# Patient Record
Sex: FEMALE | Race: OTHER | URBAN - METROPOLITAN AREA
[De-identification: names, ages, dates, MRNs, and addresses within clinical notes are randomized per-mention and may not be internally consistent; named-entity substitution may affect disease eponyms.]

---

## 2018-02-20 ENCOUNTER — OFFICE VISIT (OUTPATIENT)
Dept: INTERNAL MEDICINE CLINIC | Facility: CLINIC | Age: 23
End: 2018-02-20

## 2018-02-20 VITALS
SYSTOLIC BLOOD PRESSURE: 113 MMHG | DIASTOLIC BLOOD PRESSURE: 73 MMHG | BODY MASS INDEX: 24.72 KG/M2 | HEART RATE: 69 BPM | TEMPERATURE: 99 F | WEIGHT: 150.19 LBS | HEIGHT: 65.5 IN

## 2018-02-20 DIAGNOSIS — Z76.89 ENCOUNTER TO ESTABLISH CARE: Primary | ICD-10-CM

## 2018-02-20 DIAGNOSIS — L70.0 ACNE VULGARIS: ICD-10-CM

## 2018-02-20 PROCEDURE — 99212 OFFICE O/P EST SF 10 MIN: CPT | Performed by: INTERNAL MEDICINE

## 2018-02-20 PROCEDURE — 99203 OFFICE O/P NEW LOW 30 MIN: CPT | Performed by: INTERNAL MEDICINE

## 2018-02-20 RX ORDER — DOXYCYCLINE HYCLATE 50 MG/1
50 CAPSULE ORAL 2 TIMES DAILY
Qty: 60 CAPSULE | Refills: 1 | Status: SHIPPED | OUTPATIENT
Start: 2018-02-20 | End: 2018-05-10

## 2018-02-20 RX ORDER — CLINDAMYCIN PHOSPHATE AND BENZOYL PEROXIDE 10; 50 MG/G; MG/G
GEL TOPICAL
Qty: 1 TUBE | Refills: 3 | Status: SHIPPED | OUTPATIENT
Start: 2018-02-20 | End: 2021-10-26 | Stop reason: ALTCHOICE

## 2018-02-20 NOTE — PROGRESS NOTES
HPI:    Patient ID: Tri Luciano is a 25year old female. HPI she came today to establish care with new physician. She states that for many years  she has acne and she used to take clindamycin and doxycycline before but she stopped taking.   Yari Neves bid Disp: 1 Tube Rfl: 3   Doxycycline Hyclate 50 MG Oral Cap Take 1 capsule (50 mg total) by mouth 2 (two) times daily.  Disp: 60 capsule Rfl: 1     Allergies:No Known Allergies    HISTORY:  Past Medical History:   Diagnosis Date   • Acne       History revi heard.  Pulmonary/Chest: Effort normal and breath sounds normal. No accessory muscle usage. No respiratory distress. She has no wheezes. She has no rales. She exhibits no tenderness. Abdominal: Soft.  Bowel sounds are normal. She exhibits no shifting dull

## 2018-02-21 ENCOUNTER — TELEPHONE (OUTPATIENT)
Dept: INTERNAL MEDICINE CLINIC | Facility: CLINIC | Age: 23
End: 2018-02-21

## 2018-02-21 NOTE — TELEPHONE ENCOUNTER
Patient called, the rx for Clindamycin is not covered under her insurance, can you please send a new rx to [facility]?

## 2018-02-22 RX ORDER — CLINDAMYCIN PHOSPHATE 10 MG/G
GEL TOPICAL
Qty: 60 G | Refills: 2 | Status: SHIPPED | OUTPATIENT
Start: 2018-02-22 | End: 2021-10-26 | Stop reason: ALTCHOICE

## 2018-02-22 NOTE — TELEPHONE ENCOUNTER
Can you check with her phrmacy what is covered as substitute , acne medication most of them not covered

## 2018-02-22 NOTE — TELEPHONE ENCOUNTER
Spoke with mother Lisbet Neves, informed that two separate gels were prescribed instead of the combination medication. Verbalized understanding.

## 2018-05-10 RX ORDER — DOXYCYCLINE HYCLATE 50 MG/1
50 CAPSULE ORAL 2 TIMES DAILY
Qty: 60 CAPSULE | Refills: 1 | Status: SHIPPED | OUTPATIENT
Start: 2018-05-10 | End: 2021-10-26 | Stop reason: ALTCHOICE

## 2018-06-26 ENCOUNTER — TELEPHONE (OUTPATIENT)
Dept: INTERNAL MEDICINE CLINIC | Facility: CLINIC | Age: 23
End: 2018-06-26

## 2018-06-28 NOTE — TELEPHONE ENCOUNTER
Patient notified of Referral written Dr. Raj Palacios phone number given to patient referral mailed to patient.

## 2019-01-24 ENCOUNTER — TELEPHONE (OUTPATIENT)
Dept: INTERNAL MEDICINE CLINIC | Facility: CLINIC | Age: 24
End: 2019-01-24

## 2019-01-24 NOTE — TELEPHONE ENCOUNTER
Patient called requesting referral for patient to see a dermatologist for acne, requesting Dr. Brynn Spencer

## 2019-01-28 ENCOUNTER — TELEPHONE (OUTPATIENT)
Dept: INTERNAL MEDICINE CLINIC | Facility: CLINIC | Age: 24
End: 2019-01-28

## 2019-02-21 ENCOUNTER — OFFICE VISIT (OUTPATIENT)
Dept: DERMATOLOGY CLINIC | Facility: CLINIC | Age: 24
End: 2019-02-21
Payer: COMMERCIAL

## 2019-02-21 DIAGNOSIS — L81.0 POST-INFLAMMATORY HYPERPIGMENTATION: ICD-10-CM

## 2019-02-21 DIAGNOSIS — L70.0 ACNE VULGARIS: Primary | ICD-10-CM

## 2019-02-21 PROCEDURE — 99202 OFFICE O/P NEW SF 15 MIN: CPT | Performed by: DERMATOLOGY

## 2019-02-21 PROCEDURE — 99212 OFFICE O/P EST SF 10 MIN: CPT | Performed by: DERMATOLOGY

## 2019-02-21 RX ORDER — CLINDAMYCIN AND BENZOYL PEROXIDE 10; 50 MG/G; MG/G
GEL TOPICAL
Qty: 50 G | Refills: 3 | Status: SHIPPED | OUTPATIENT
Start: 2019-02-21 | End: 2021-10-26 | Stop reason: ALTCHOICE

## 2019-02-21 RX ORDER — TRETINOIN 0.025 %
1 CREAM (GRAM) TOPICAL NIGHTLY
Qty: 30 G | Refills: 3 | Status: SHIPPED | OUTPATIENT
Start: 2019-02-21 | End: 2021-10-26 | Stop reason: ALTCHOICE

## 2019-02-21 RX ORDER — DESOGESTREL AND ETHINYL ESTRADIOL 0.15-0.03
1 KIT ORAL DAILY
Qty: 1 PACKAGE | Refills: 11 | Status: SHIPPED | OUTPATIENT
Start: 2019-02-21 | End: 2021-10-26 | Stop reason: ALTCHOICE

## 2019-02-21 NOTE — PROGRESS NOTES
HPI:     Chief Complaint     Derm Problem        HPI     Derm Problem      Additional comments: New pt. Pt presenting today with possible acne. c/o dark spots in face. no personal or family HX of skin cancer.           Last edited by Eladio Palma MA on Marital status: Single      Spouse name: Not on file      Number of children: Not on file      Years of education: Not on file      Highest education level: Not on file    Occupational History      Not on file    Social Needs      Financial resource strain on chest.  Neck and back clear    ASSESSMENT/PLAN:   Acne vulgaris  (primary encounter diagnosis)-suboptimal improvement from prior meds. Since it does flare around her menstrual period, we did discuss possibility of treatment with an oral contraceptive.

## 2019-02-22 ENCOUNTER — TELEPHONE (OUTPATIENT)
Dept: DERMATOLOGY CLINIC | Facility: CLINIC | Age: 24
End: 2019-02-22

## 2019-02-22 NOTE — TELEPHONE ENCOUNTER
•  Clindamycin Phos-Benzoyl Perox 1-5 % External Gel, Apply daily as tolerated, Disp: 50 g, Rfl: 3      Key: [id number]

## 2019-02-27 NOTE — TELEPHONE ENCOUNTER
PA approved for 1 year - sent to scan. Left detailed msg for pt. [id number] [facility] informed also, they will reach out to pt as well.

## 2019-04-11 ENCOUNTER — TELEPHONE (OUTPATIENT)
Dept: INTERNAL MEDICINE CLINIC | Facility: CLINIC | Age: 24
End: 2019-04-11

## 2019-04-11 NOTE — TELEPHONE ENCOUNTER
Believes she has a clogged ear, would like to know anything she can do or if she needs an appt.  pls advise

## 2020-12-15 ENCOUNTER — TELEPHONE (OUTPATIENT)
Dept: INTERNAL MEDICINE CLINIC | Facility: CLINIC | Age: 25
End: 2020-12-15

## 2021-01-21 ENCOUNTER — TELEPHONE (OUTPATIENT)
Dept: INTERNAL MEDICINE CLINIC | Facility: CLINIC | Age: 26
End: 2021-01-21

## 2021-01-21 NOTE — TELEPHONE ENCOUNTER
Pt would like a referral for dermatology. Pt requests call once referral is in system.  Please advise

## 2021-10-26 ENCOUNTER — OFFICE VISIT (OUTPATIENT)
Dept: INTERNAL MEDICINE CLINIC | Facility: CLINIC | Age: 26
End: 2021-10-26
Payer: COMMERCIAL

## 2021-10-26 VITALS
BODY MASS INDEX: 34.16 KG/M2 | SYSTOLIC BLOOD PRESSURE: 110 MMHG | WEIGHT: 205 LBS | HEIGHT: 65 IN | HEART RATE: 77 BPM | DIASTOLIC BLOOD PRESSURE: 72 MMHG

## 2021-10-26 DIAGNOSIS — L70.0 ACNE VULGARIS: Primary | ICD-10-CM

## 2021-10-26 DIAGNOSIS — Z00.00 ANNUAL PHYSICAL EXAM: ICD-10-CM

## 2021-10-26 PROCEDURE — 3078F DIAST BP <80 MM HG: CPT | Performed by: INTERNAL MEDICINE

## 2021-10-26 PROCEDURE — 3008F BODY MASS INDEX DOCD: CPT | Performed by: INTERNAL MEDICINE

## 2021-10-26 PROCEDURE — 99203 OFFICE O/P NEW LOW 30 MIN: CPT | Performed by: INTERNAL MEDICINE

## 2021-10-26 PROCEDURE — 3074F SYST BP LT 130 MM HG: CPT | Performed by: INTERNAL MEDICINE

## 2021-10-26 RX ORDER — CLINDAMYCIN PHOSPHATE 10 MG/G
GEL TOPICAL
Qty: 60 G | Refills: 0 | Status: SHIPPED | OUTPATIENT
Start: 2021-10-26

## 2021-10-26 RX ORDER — DOXYCYCLINE HYCLATE 50 MG/1
50 CAPSULE ORAL 2 TIMES DAILY
Qty: 60 CAPSULE | Refills: 0 | Status: SHIPPED | OUTPATIENT
Start: 2021-10-26

## 2021-10-26 NOTE — PROGRESS NOTES
Subjective:     Patient ID: Kena Bruce is a 26year old female. HPI   She came in today for follow-up on her acne.   According to her she had acne for a long time she was seen by dermatology in 2019 she took medication for some time her skin impr Musculoskeletal:      Cervical back: Normal range of motion and neck supple. Skin:     General: Skin is warm. Comments: + acne   Neurological:      Mental Status: She is alert. Mental status is at baseline.          Assessment & Plan:   Acne vulgar

## 2021-11-02 ENCOUNTER — TELEPHONE (OUTPATIENT)
Dept: INTERNAL MEDICINE CLINIC | Facility: CLINIC | Age: 26
End: 2021-11-02

## 2021-11-02 NOTE — TELEPHONE ENCOUNTER
Patient calling to give [address] vaccine information which 4/12/2021 and 5/3/21 at [facility] in [place], [place] sponsored by: Juli Banuelos

## 2021-11-07 ENCOUNTER — IMMUNIZATION (OUTPATIENT)
Dept: LAB | Facility: HOSPITAL | Age: 26
End: 2021-11-07
Attending: EMERGENCY MEDICINE
Payer: COMMERCIAL

## 2021-11-07 DIAGNOSIS — Z23 NEED FOR VACCINATION: Primary | ICD-10-CM

## 2021-11-07 PROCEDURE — 0004A SARSCOV2 VAC 30MCG/0.3ML IM: CPT

## 2021-11-23 ENCOUNTER — MED REC SCAN ONLY (OUTPATIENT)
Dept: INTERNAL MEDICINE CLINIC | Facility: CLINIC | Age: 26
End: 2021-11-23

## 2021-11-23 ENCOUNTER — IMMUNIZATION (OUTPATIENT)
Dept: INTERNAL MEDICINE CLINIC | Facility: CLINIC | Age: 26
End: 2021-11-23
Payer: COMMERCIAL

## 2021-11-23 DIAGNOSIS — Z23 NEED FOR VACCINATION: Primary | ICD-10-CM

## 2021-11-23 PROCEDURE — 90471 IMMUNIZATION ADMIN: CPT | Performed by: INTERNAL MEDICINE

## 2021-11-23 PROCEDURE — 90686 IIV4 VACC NO PRSV 0.5 ML IM: CPT | Performed by: INTERNAL MEDICINE

## 2022-10-18 ENCOUNTER — TELEPHONE (OUTPATIENT)
Dept: INTERNAL MEDICINE CLINIC | Facility: CLINIC | Age: 27
End: 2022-10-18

## 2022-10-18 NOTE — TELEPHONE ENCOUNTER
Patient called (identified name and ),   Patient had unprotected sex yesterday. Asking about emergency contraception. Took one Plan B tablet but an hour later took a second because she read that being overweight can make it less effective. Finished last menses 10/14/2022. Tried to call [facility] but could not get through. Asking what else can she do, but she decided to make video visit today at 2:45 pm to discuss with Dr Desirae Villalba.     Future Appointments   Date Time Provider Cl Mccarty   10/18/2022  2:45 PM Gardenia Chan MD [facility]

## 2022-11-26 ENCOUNTER — TELEPHONE (OUTPATIENT)
Dept: INTERNAL MEDICINE CLINIC | Facility: CLINIC | Age: 27
End: 2022-11-26

## 2022-11-26 LAB — AMB EXT COVID-19 RESULT: DETECTED

## 2022-11-26 NOTE — TELEPHONE ENCOUNTER
During telephone encounter today with patient's father Karolina Purvis, patient stated that she tested positive for Covid this AM, along with her father. Patient wanted her chart updated, which was done. Discussed isolation and masking guidelines with her and her father. They are presently in [facility].

## 2023-03-07 ENCOUNTER — TELEMEDICINE (OUTPATIENT)
Dept: INTERNAL MEDICINE CLINIC | Facility: CLINIC | Age: 28
End: 2023-03-07

## 2023-03-07 DIAGNOSIS — Z30.014 ENCOUNTER FOR INITIAL PRESCRIPTION OF INTRAUTERINE CONTRACEPTIVE DEVICE (IUD): Primary | ICD-10-CM

## 2023-03-07 PROCEDURE — 99213 OFFICE O/P EST LOW 20 MIN: CPT | Performed by: INTERNAL MEDICINE

## 2023-03-07 RX ORDER — NORGESTIMATE AND ETHINYL ESTRADIOL 7DAYSX3 LO
1 KIT ORAL DAILY
Qty: 28 TABLET | Refills: 1 | Status: SHIPPED | OUTPATIENT
Start: 2023-03-07 | End: 2024-03-01

## 2023-03-08 ENCOUNTER — TELEPHONE (OUTPATIENT)
Dept: INTERNAL MEDICINE CLINIC | Facility: CLINIC | Age: 28
End: 2023-03-08

## 2023-03-08 NOTE — TELEPHONE ENCOUNTER
Patient is returning a call missed and she does not have voice message set up and would like to know if PCP's office was calling. No TE noted on file. Please advise.

## 2023-07-07 ENCOUNTER — NURSE TRIAGE (OUTPATIENT)
Dept: INTERNAL MEDICINE CLINIC | Facility: CLINIC | Age: 28
End: 2023-07-07

## 2023-07-07 NOTE — TELEPHONE ENCOUNTER
Action Requested: Summary for Provider     []  Critical Lab, Recommendations Needed  [] Need Additional Advice  []   FYI    []   Need Orders  [] Need Medications Sent to Pharmacy  []  Other     SUMMARY: Per protocol, patient should be seen in the office today or tomorrow. Video visit scheduled. Future Appointments   Date Time Provider Cl Mccarty   7/7/2023  2:00 PM FREDDY Sun     Reason for call: Urinary  Onset: 1 week ago    Patient reports of urinary symptoms of frequency and burning sensation with urination that started approximately 1 week ago. Denies any other complaints at this time. She states it is more of a discomfort now but is asking for antibiotic prescription. Advised to schedule an appointment. Patient verbalized understanding and agreed with plan of care. Video visit scheduled as above.      Reason for Disposition   Patient wants to be seen    Protocols used: Urinary Symptoms-A-[place]

## 2023-07-10 ENCOUNTER — OFFICE VISIT (OUTPATIENT)
Dept: INTERNAL MEDICINE CLINIC | Facility: CLINIC | Age: 28
End: 2023-07-10

## 2023-07-10 VITALS
DIASTOLIC BLOOD PRESSURE: 82 MMHG | OXYGEN SATURATION: 99 % | WEIGHT: 231 LBS | HEIGHT: 65 IN | BODY MASS INDEX: 38.49 KG/M2 | SYSTOLIC BLOOD PRESSURE: 118 MMHG | HEART RATE: 77 BPM

## 2023-07-10 DIAGNOSIS — R30.0 BURNING WITH URINATION: Primary | ICD-10-CM

## 2023-07-10 LAB
APPEARANCE: CLEAR
BILIRUBIN: NEGATIVE
GLUCOSE (URINE DIPSTICK): NEGATIVE MG/DL
KETONES (URINE DIPSTICK): NEGATIVE MG/DL
LEUKOCYTES: NEGATIVE
NITRITE, URINE: NEGATIVE
OCCULT BLOOD: NEGATIVE
PH, URINE: 6 (ref 4.5–8)
PROTEIN (URINE DIPSTICK): NEGATIVE MG/DL
SPECIFIC GRAVITY: 1 (ref 1–1.03)
URINE-COLOR: YELLOW
UROBILINOGEN,SEMI-QN: 0.2 MG/DL (ref 0–1.9)

## 2023-07-10 PROCEDURE — 99213 OFFICE O/P EST LOW 20 MIN: CPT | Performed by: INTERNAL MEDICINE

## 2023-07-10 PROCEDURE — 3008F BODY MASS INDEX DOCD: CPT | Performed by: INTERNAL MEDICINE

## 2023-07-10 PROCEDURE — 81002 URINALYSIS NONAUTO W/O SCOPE: CPT | Performed by: INTERNAL MEDICINE

## 2023-07-10 PROCEDURE — 3079F DIAST BP 80-89 MM HG: CPT | Performed by: INTERNAL MEDICINE

## 2023-07-10 PROCEDURE — 3074F SYST BP LT 130 MM HG: CPT | Performed by: INTERNAL MEDICINE

## 2023-07-10 NOTE — TELEPHONE ENCOUNTER
Patient called office. Patient's date of birth and full name both confirmed. Wants antibiotics. She cancelled 7/7/23 appointment because she was feeling better. Triaged per protocol and advised care advice per protocol. Persisting urinary frequency and urinary pain  Evaluation advised today. Provided education Urinary symptoms and need for testing and evaluation prior to antibiotics. She verbalizes understanding of all information, and agreeable to plan. appointment made  Address provided.    Future Appointments   Date Time Provider Cl Mccarty   7/10/2023  1:20 PM Esther Patel MD [facility]

## 2024-01-02 ENCOUNTER — TELEPHONE (OUTPATIENT)
Facility: CLINIC | Age: 29
End: 2024-01-02

## 2024-01-02 NOTE — TELEPHONE ENCOUNTER
Spoke to patient and assisted her with scheduling an appointment to discuss prescribing birth control.    Future Appointments   Date Time Provider Department Center   1/12/2024  9:00 AM Anig Man MD [facility] [place]

## 2024-01-02 NOTE — TELEPHONE ENCOUNTER
Patient asking to get back on oral contraceptive, could not verify contraceptive name. Asking if can be sent without a visit.

## 2024-01-18 ENCOUNTER — OFFICE VISIT (OUTPATIENT)
Dept: INTERNAL MEDICINE CLINIC | Facility: CLINIC | Age: 29
End: 2024-01-18

## 2024-01-18 ENCOUNTER — LAB ENCOUNTER (OUTPATIENT)
Dept: LAB | Facility: REFERENCE LAB | Age: 29
End: 2024-01-18
Attending: INTERNAL MEDICINE
Payer: COMMERCIAL

## 2024-01-18 VITALS
SYSTOLIC BLOOD PRESSURE: 117 MMHG | WEIGHT: 218 LBS | DIASTOLIC BLOOD PRESSURE: 70 MMHG | TEMPERATURE: 98 F | BODY MASS INDEX: 36.32 KG/M2 | HEART RATE: 74 BPM | HEIGHT: 65 IN | OXYGEN SATURATION: 99 %

## 2024-01-18 DIAGNOSIS — Z01.419 ENCOUNTER FOR WELL WOMAN EXAM WITH ROUTINE GYNECOLOGICAL EXAM: ICD-10-CM

## 2024-01-18 DIAGNOSIS — Z00.00 ANNUAL PHYSICAL EXAM: ICD-10-CM

## 2024-01-18 DIAGNOSIS — Z30.09 ENCOUNTER FOR COUNSELING REGARDING CONTRACEPTION: Primary | Chronic | ICD-10-CM

## 2024-01-18 LAB
ALBUMIN SERPL-MCNC: 4.6 G/DL (ref 3.2–4.8)
ALBUMIN/GLOB SERPL: 1.7 {RATIO} (ref 1–2)
ALP LIVER SERPL-CCNC: 82 U/L
ALT SERPL-CCNC: 21 U/L
ANION GAP SERPL CALC-SCNC: 0 MMOL/L (ref 0–18)
AST SERPL-CCNC: 19 U/L (ref ?–34)
BASOPHILS # BLD AUTO: 0.04 X10(3) UL (ref 0–0.2)
BASOPHILS NFR BLD AUTO: 0.5 %
BILIRUB SERPL-MCNC: 1 MG/DL (ref 0.3–1.2)
BUN BLD-MCNC: <5 MG/DL (ref 9–23)
CALCIUM BLD-MCNC: 9.7 MG/DL (ref 8.7–10.4)
CHLORIDE SERPL-SCNC: 111 MMOL/L (ref 98–112)
CO2 SERPL-SCNC: 28 MMOL/L (ref 21–32)
CREAT BLD-MCNC: 0.68 MG/DL
DEPRECATED RDW RBC AUTO: 41.7 FL (ref 35.1–46.3)
EGFRCR SERPLBLD CKD-EPI 2021: 122 ML/MIN/1.73M2 (ref 60–?)
EOSINOPHIL # BLD AUTO: 0.11 X10(3) UL (ref 0–0.7)
EOSINOPHIL NFR BLD AUTO: 1.5 %
ERYTHROCYTE [DISTWIDTH] IN BLOOD BY AUTOMATED COUNT: 13.4 % (ref 11–15)
FASTING STATUS PATIENT QL REPORTED: NO
GLOBULIN PLAS-MCNC: 2.7 G/DL (ref 2.8–4.4)
GLUCOSE BLD-MCNC: 83 MG/DL (ref 70–99)
HCT VFR BLD AUTO: 40.6 %
HGB BLD-MCNC: 13.7 G/DL
IMM GRANULOCYTES # BLD AUTO: 0.01 X10(3) UL (ref 0–1)
IMM GRANULOCYTES NFR BLD: 0.1 %
LYMPHOCYTES # BLD AUTO: 2.17 X10(3) UL (ref 1–4)
LYMPHOCYTES NFR BLD AUTO: 29.7 %
MCH RBC QN AUTO: 28.6 PG (ref 26–34)
MCHC RBC AUTO-ENTMCNC: 33.7 G/DL (ref 31–37)
MCV RBC AUTO: 84.8 FL
MONOCYTES # BLD AUTO: 0.65 X10(3) UL (ref 0.1–1)
MONOCYTES NFR BLD AUTO: 8.9 %
NEUTROPHILS # BLD AUTO: 4.32 X10 (3) UL (ref 1.5–7.7)
NEUTROPHILS # BLD AUTO: 4.32 X10(3) UL (ref 1.5–7.7)
NEUTROPHILS NFR BLD AUTO: 59.3 %
PLATELET # BLD AUTO: 243 10(3)UL (ref 150–450)
POTASSIUM SERPL-SCNC: 4.5 MMOL/L (ref 3.5–5.1)
PROT SERPL-MCNC: 7.3 G/DL (ref 5.7–8.2)
RBC # BLD AUTO: 4.79 X10(6)UL
SODIUM SERPL-SCNC: 139 MMOL/L (ref 136–145)
TSI SER-ACNC: 2.44 MIU/ML (ref 0.55–4.78)
VIT B12 SERPL-MCNC: 487 PG/ML (ref 211–911)
VIT D+METAB SERPL-MCNC: 7 NG/ML (ref 30–100)
WBC # BLD AUTO: 7.3 X10(3) UL (ref 4–11)

## 2024-01-18 PROCEDURE — 80053 COMPREHEN METABOLIC PANEL: CPT

## 2024-01-18 PROCEDURE — 85025 COMPLETE CBC W/AUTO DIFF WBC: CPT

## 2024-01-18 PROCEDURE — 82306 VITAMIN D 25 HYDROXY: CPT

## 2024-01-18 PROCEDURE — 99214 OFFICE O/P EST MOD 30 MIN: CPT | Performed by: INTERNAL MEDICINE

## 2024-01-18 PROCEDURE — 3008F BODY MASS INDEX DOCD: CPT | Performed by: INTERNAL MEDICINE

## 2024-01-18 PROCEDURE — 3078F DIAST BP <80 MM HG: CPT | Performed by: INTERNAL MEDICINE

## 2024-01-18 PROCEDURE — 3074F SYST BP LT 130 MM HG: CPT | Performed by: INTERNAL MEDICINE

## 2024-01-18 PROCEDURE — 82607 VITAMIN B-12: CPT

## 2024-01-18 PROCEDURE — 36415 COLL VENOUS BLD VENIPUNCTURE: CPT

## 2024-01-18 PROCEDURE — 84443 ASSAY THYROID STIM HORMONE: CPT

## 2024-01-18 NOTE — PROGRESS NOTES
Subjective:     Patient ID: Pooja Tillman is a 28 year old female.    Contraception        History/Other:   She is  here today for regular check up      She is  due for blood work      She also want to discuss about birth controll  Review of Systems   Constitutional: Negative.    HENT: Negative.     Eyes: Negative.    Respiratory: Negative.     Cardiovascular: Negative.    Gastrointestinal: Negative.    Genitourinary: Negative.    Musculoskeletal: Negative.    Neurological: Negative.    Hematological: Negative.    Psychiatric/Behavioral: Negative.       No current outpatient medications on file.     Allergies:No Known Allergies    Past Medical History:   Diagnosis Date    Acne       History reviewed. No pertinent surgical history.   Family History   Problem Relation Age of Onset    Hypertension Father     High Cholesterol Father     Hypertension Mother     High Cholesterol Mother       Social History:   Social History     Socioeconomic History    Marital status: Single   Tobacco Use    Smoking status: Never    Smokeless tobacco: Never   Vaping Use    Vaping Use: Never used   Substance and Sexual Activity    Alcohol use: No    Drug use: Never   Other Topics Concern    Reaction to local anesthetic No        Objective:   Physical Exam  Vitals and nursing note reviewed.   Constitutional:       Appearance: Normal appearance.   HENT:      Head: Normocephalic and atraumatic.   Cardiovascular:      Rate and Rhythm: Normal rate and regular rhythm.      Pulses: Normal pulses.      Heart sounds: Normal heart sounds.   Pulmonary:      Effort: Pulmonary effort is normal.      Breath sounds: Normal breath sounds.   Abdominal:      Palpations: Abdomen is soft.   Musculoskeletal:         General: Normal range of motion.      Cervical back: Normal range of motion and neck supple.   Skin:     General: Skin is warm.   Neurological:      Mental Status: She is alert. Mental status is at baseline.         Assessment & Plan:   1.  Encounter for well woman exam with routine gynecological exam -  Referral  for ob    2. Annual physical exam - blood work    3.  Contraception counseling- will   follow up with   ob for iud    Orders Placed This Encounter   Procedures    CBC W Differential W Platelet [E]    Comp Metabolic Panel (14)    TSH W Reflex To Free T4 [E]    Vitamin B12 [E]    Vitamin D [E]       Meds This Visit:  Requested Prescriptions      No prescriptions requested or ordered in this encounter       Imaging & Referrals:  [facility] - INTERNAL

## 2024-01-30 ENCOUNTER — OFFICE VISIT (OUTPATIENT)
Dept: OBGYN CLINIC | Facility: CLINIC | Age: 29
End: 2024-01-30

## 2024-01-30 VITALS
SYSTOLIC BLOOD PRESSURE: 120 MMHG | HEIGHT: 65 IN | DIASTOLIC BLOOD PRESSURE: 84 MMHG | BODY MASS INDEX: 36.64 KG/M2 | WEIGHT: 219.94 LBS

## 2024-01-30 DIAGNOSIS — Z01.419 WOMEN'S ANNUAL ROUTINE GYNECOLOGICAL EXAMINATION: Primary | ICD-10-CM

## 2024-01-30 DIAGNOSIS — Z11.3 SCREENING EXAMINATION FOR STD (SEXUALLY TRANSMITTED DISEASE): ICD-10-CM

## 2024-01-30 DIAGNOSIS — Z12.4 ENCOUNTER FOR PAPANICOLAOU SMEAR FOR CERVICAL CANCER SCREENING: ICD-10-CM

## 2024-01-30 PROCEDURE — 99395 PREV VISIT EST AGE 18-39: CPT | Performed by: OBSTETRICS & GYNECOLOGY

## 2024-01-30 PROCEDURE — 3074F SYST BP LT 130 MM HG: CPT | Performed by: OBSTETRICS & GYNECOLOGY

## 2024-01-30 PROCEDURE — 3008F BODY MASS INDEX DOCD: CPT | Performed by: OBSTETRICS & GYNECOLOGY

## 2024-01-30 PROCEDURE — 3079F DIAST BP 80-89 MM HG: CPT | Performed by: OBSTETRICS & GYNECOLOGY

## 2024-01-30 NOTE — PROGRESS NOTES
[facility]  Obstetrics and Gynecology  Gynecology Visit    Chief Complaint   Patient presents with    Annual           Pooja Skolba is a 28 year old female who presents for Annual exam.    LMP: 2024.    Menses regular: 28.    Menstrual flow normal: Moderate.    Birth control or HRT:  No.   Refill No  Last Pap Smear: has never had.  Any history of abnormal paps: no   Last MMG: n/a  Any Medication Refills needed today?: no  Sleep: 7-8 hrs.    Diet: Poor.    Exercise: walks.   Screening labs/Blood work today: no.     Colonoscopy (if over 44 y/o): n/a.   Gardasil:(age 9-44 y/o) Unsure.   Genetic Cancer screen (if indicated): no.   Flu (Aug-April): 10/18/2023 .TDAP (every 10 years) Unsure last records .      Additional Problems/concerns: Patient here to establish care. Patient interested in having an IUD.      Next Appt: will schedule in future    Immunization History   Administered Date(s) Administered    Covid-19 Vaccine Pfizer 30 mcg/0.3 ml 2021, 2021, 2021    FLULAVAL 6 months & older 0.5 ml Prefilled syringe (72305) 2021    FLUMIST NASAL 2 YR-49 YRS (02013) 2013    FLUZONE 6 months and older PFS 0.5 ml (42128) 2014    Flublok Quad Influenza Vaccine (71533) 10/18/2023    Hep B, Unspecified Formulation 1996, 1997, 1997    MMR 1998, 2002    TDAP 2008    Varicella 2002, 2007       No current outpatient medications on file.    No Known Allergies    OB History    Para Term  AB Living   0 0 0 0 0 0   SAB IAB Ectopic Multiple Live Births   0 0 0 0 0       Past Medical History:   Diagnosis Date    Acne        History reviewed. No pertinent surgical history.    Family History   Problem Relation Age of Onset    Hypertension Father     High Cholesterol Father     Hypertension Mother     High Cholesterol Mother         Tobacco  Allergies  Meds  Med Hx  Surg Hx  Fam Hx  Soc Hx        Social History      Socioeconomic History    Marital status: Single     Spouse name: Not on file    Number of children: Not on file    Years of education: Not on file    Highest education level: Not on file   Occupational History    Not on file   Tobacco Use    Smoking status: Never     Passive exposure: Never    Smokeless tobacco: Never   Vaping Use    Vaping Use: Never used   Substance and Sexual Activity    Alcohol use: No    Drug use: Never    Sexual activity: Yes     Partners: Male     Birth control/protection: Condom   Other Topics Concern    Grew up on a farm Not Asked    History of tanning Not Asked    Outdoor occupation Not Asked    Breast feeding Not Asked    Reaction to local anesthetic No   Social History Narrative    Not on file     Social Determinants of Health     Financial Resource Strain: Not on file   Food Insecurity: Not on file   Transportation Needs: Not on file   Physical Activity: Not on file   Stress: Not on file   Social Connections: Not on file   Housing Stability: Not on file       /84   Ht 5' 5\" (1.651 m)   Wt 219 lb 14.5 oz (99.7 kg)   LMP 12/20/2023 (Approximate)   BMI 36.59 kg/m²     Wt Readings from Last 3 Encounters:   01/30/24 219 lb 14.5 oz (99.7 kg)   01/18/24 218 lb (98.9 kg)   07/10/23 231 lb (104.8 kg)         Health Maintenance   Topic Date Due    Influenza Vaccine (1) 08/01/2021    Screen for Cervical Cancer 11/05/2021    DTaP,Tdap and Td Vaccines (3 - Td or Tdap) 03/18/2025    Hepatitis C Screening Completed    HIV Screening Completed    COVID-19 Vaccine Completed     Review of Systems   General: Present- Feeling well. Not Present- Chills, Fever, Weight Gain and Weight Loss.  HEENT: Not Present- Headache and Sore Throat.  Respiratory: Not Present- Cough, Difficulty Breathing, Hemoptysis and Sputum Production.  Cardiovascular: Not Present- Chest Pain, Elevated Blood Pressure, Fainting / Blacking Out and Shortness of Breath.  Gastrointestinal: Not Present- Constipation, Diarrhea,  Nausea and Vomiting.  Female Genitourinary: Not Present- Discharge, Dysuria and Frequency.  Musculoskeletal: Not Present- Leg Cramps and Swelling of Extremities.  Neurological: Not Present- Dizziness and Headaches.  Psychiatric: Not Present- Anxiety and Depression.  Endocrine: Not Present- Appetite Changes, Hair Changes and Thyroid Problems.  Hematology: Not Present- Easy Bruising and Excessive bleeding.  All other systems negative     Physical Exam The physical exam findings are as follows:     General   Mental Status - Alert. General Appearance - Cooperative. Orientation - Oriented X4. Build & Nutrition - Well nourished.    Head and Neck  Thyroid   Gland Characteristics - normal size and consistency.    Chest and Lung Exam   Inspection:   Chest Wall: - Normal.  Percussion:   Quality and Intensity: - Percussion normal.  Palpation: - Palpation normal.  Auscultation:   Breath sounds: - Normal.  Adventitious sounds: - No Adventitious sounds.    Breast   Nipples: Characteristics - Bilateral - Normal. Discharge - Bilateral - None.  Breast - Bilateral - Symmetric.    Cardiovascular   Auscultation: Rhythm - Regular. Heart Sounds - Normal heart sounds.  Murmurs & Other Heart Sounds: Auscultation of the heart reveals - No Murmurs.    Abdomen   Inspection: Inspection of the abdomen reveals - No Hernias. Incisional scars - no incisional scars.  Palpation/Percussion: Palpation and Percussion of the abdomen reveal - Non Tender and No Palpable abdominal masses.  Liver: - Normal.  Auscultation: Auscultation of the abdomen reveals - Bowel sounds normal.    Female Genitourinary     External Genitalia   Perineum - Normal. Bartholin's Gland - Bilateral - Normal. Clitoris - Normal.  Introitus: Characteristics - No Cystocele, Enterocele or Rectocele. Discharge - None.  Labia Majora: Lesions - Bilateral - None. Characteristics - Bilateral - Normal.  Labia Minora: Lesions - Bilateral - None. Characteristics - Bilateral -  Normal.  Urethra: Characteristics - Normal. Discharge - None.  [place]ak[place] Gland - Bilateral - Normal.  Vulva: Characteristics - Normal. Lesions - None.    Speculum & Bimanual   Vagina:   Vaginal Wall: - Normal.  Vaginal Lesions - None. Vaginal Mucosa - Normal.  Cervix: Characteristics - No Motion tenderness. Discharge - None.  Uterus: Characteristics - Normal. Position - Midposition.  Adnexa: Characteristics - Bilateral - Normal. Masses - No Adnexal Masses.  Wet Mount: pH - 3.8-4.2. Vaginal discharge - Clear  and Thin. Amine Odor - Absent. Main patient complaints - Discharge. Microscopy - Lactobacilli and Epithelial cells.    Rectal   Anorectal Exam: External - normal external exam.    Peripheral Vascular   Upper Extremity:   Palpation: - Pulses bilaterally normal.  Lower Extremity: Inspection - Bilateral - Inspection Normal.  Palpation: Edema - Bilateral - No edema.    Neurologic   Mental Status: - Normal.    Lymphatic  General Lymphatics   Description - Normal .       RTO while on menses for paraguard IUD - pre treat with motrin     1. Women's annual routine gynecological examination

## 2024-01-31 LAB
C TRACH DNA SPEC QL NAA+PROBE: NEGATIVE
N GONORRHOEA DNA SPEC QL NAA+PROBE: NEGATIVE

## 2024-02-05 LAB
.: NORMAL
.: NORMAL

## 2024-02-26 ENCOUNTER — TELEPHONE (OUTPATIENT)
Dept: OBGYN CLINIC | Facility: CLINIC | Age: 29
End: 2024-02-26

## 2024-02-26 NOTE — TELEPHONE ENCOUNTER
Pt Name and  verified.  TA booked completely this week. Pt tentatively scheduled at end of March.

## 2024-03-18 ENCOUNTER — TELEPHONE (OUTPATIENT)
Dept: OBGYN CLINIC | Facility: CLINIC | Age: 29
End: 2024-03-18

## 2024-03-18 NOTE — TELEPHONE ENCOUNTER
Pt voices, per her apple watch, her menses is due 03/29/24. Pt has IUD insertion appointment on 03/26/24. Pt wants to keep this appointment and schedule a later appointment Pt also scheduled for 04/01/24. Pt voices she will cancel one of the appointments once she figures out when her menses will be.

## 2024-03-18 NOTE — TELEPHONE ENCOUNTER
Patient has questions regarding procedure scheduled 3/26, patient unsure if she will be on menses and asking if she should schedule another appointment. Please call at [phone],thanks.

## 2024-04-01 ENCOUNTER — OFFICE VISIT (OUTPATIENT)
Dept: OBGYN CLINIC | Facility: CLINIC | Age: 29
End: 2024-04-01
Payer: COMMERCIAL

## 2024-04-01 VITALS
SYSTOLIC BLOOD PRESSURE: 122 MMHG | DIASTOLIC BLOOD PRESSURE: 78 MMHG | WEIGHT: 219 LBS | BODY MASS INDEX: 36.49 KG/M2 | HEIGHT: 65 IN

## 2024-04-01 DIAGNOSIS — Z30.430 ENCOUNTER FOR INSERTION OF INTRAUTERINE CONTRACEPTIVE DEVICE (IUD): ICD-10-CM

## 2024-04-01 DIAGNOSIS — Z01.812 PRE-PROCEDURAL LABORATORY EXAMINATION: Primary | ICD-10-CM

## 2024-04-01 RX ORDER — COPPER 313.4 MG/1
1 INTRAUTERINE DEVICE INTRAUTERINE ONCE
Status: SHIPPED | OUTPATIENT
Start: 2024-04-01

## 2024-04-01 NOTE — PROGRESS NOTES
Chief Complaint   Patient presents with    IUD         Pooja Tillman is a 28 year old female who presents for IUD placement.    LMP: 2024.    Menses regular: yes.    Menstrual flow normal: normal.    Birth control or HRT: condoms.   Refill 0  Last Pap Smear: 24 . Any history of abnormal paps: no   Gardasil:(age 9-46 y/o) no.   Any medication refills needed today?: no    Problems/concerns: Patient would like Paragard IUD placed.  Patient is unable to leave urine for POC pregnancy test. Reports she took one yesterday at home and was negative. Patient is okay with not performing again today. Uses condoms with intercourse.    Would like to know if she's able to use a menstrual cup during cycles, informed we would check with  as I was unaware of the answer.    Next Appt: n/a        Immunization History   Administered Date(s) Administered    Covid-19 Vaccine Pfizer 30 mcg/0.3 ml 2021, 2021, 2021    DTAP 1997, 1997, 1997, 1998, 2002    FLULAVAL 6 months & older 0.5 ml Prefilled syringe (22300) 2021    FLUMIST NASAL 2 YR-49 YRS (91671) 2013    FLUZONE 6 months and older PFS 0.5 ml (69286) 2014    Flublok Quad Influenza Vaccine (46438) 10/18/2023    HEP A 2007, 2011    Hep B, Unspecified Formulation 1996, 1997, 1997    IPV 1997, 1997, 1997, 2002    MMR 1998, 2002    Meningococcal Vaccine 2013    Meningococcal-Menveo 2month-55yr 2008    TDAP 2008    Varicella 2002, 2007       No current outpatient medications on file.    No Known Allergies    OB History    Para Term  AB Living   0 0 0 0 0 0   SAB IAB Ectopic Multiple Live Births   0 0 0 0 0       Past Medical History:   Diagnosis Date    Acne        No past surgical history on file.    Family History   Problem Relation Age of Onset    Hypertension Father     High Cholesterol  Father     Hypertension Mother     High Cholesterol Mother                Social History     Socioeconomic History    Marital status: Single     Spouse name: Not on file    Number of children: Not on file    Years of education: Not on file    Highest education level: Not on file   Occupational History    Not on file   Tobacco Use    Smoking status: Never     Passive exposure: Never    Smokeless tobacco: Never   Vaping Use    Vaping Use: Never used   Substance and Sexual Activity    Alcohol use: No    Drug use: Never    Sexual activity: Yes     Partners: Male     Birth control/protection: Condom   Other Topics Concern    Grew up on a farm Not Asked    History of tanning Not Asked    Outdoor occupation Not Asked    Breast feeding Not Asked    Reaction to local anesthetic No   Social History Narrative    Not on file     Social Determinants of Health     Financial Resource Strain: Not on file   Food Insecurity: Not on file   Transportation Needs: Not on file   Physical Activity: Not on file   Stress: Not on file   Social Connections: Not on file   Housing Stability: Not on file     /78   Ht 5' 5\" (1.651 m)   Wt 219 lb (99.3 kg)   LMP 03/25/2024 (Approximate)     Wt Readings from Last 3 Encounters:   04/01/24 219 lb (99.3 kg)   01/30/24 219 lb 14.5 oz (99.7 kg)   01/18/24 218 lb (98.9 kg)       Health Maintenance   Topic Date Due    Annual Physical  Never done    DTaP,Tdap,and Td Vaccines (7 - Td or Tdap) 06/19/2018    COVID-19 Vaccine (4 - 2023-24 season) 09/01/2023    Pap Smear  01/30/2027    Influenza Vaccine  Completed    Annual Depression Screening  Completed    Pneumococcal Vaccine: Birth to 64yrs  Aged Out         Review of Systems   General: Present- Feeling well. Not Present- Fever.  Female Genitourinary: Not Present- Dysmenorrhea, Dyspareunia, Flank Pain, Frequency, Menstrual Irregularities, Pelvic Pain, Urgency, Urinary Complaints, Vaginal Bleeding and Vaginal dryness.  Pain: Present- Pain Rating - 0  on a 0-10 scale.  All other systems negative       Physical Exam   The physical exam findings are as follows:     Abdomen   Inspection: - Inspection Normal.  Palpation/Percussion: Palpation and Percussion of the abdomen reveal - Non Tender, No hepatosplenomegaly and No Palpable abdominal masses.    Female Genitourinary     External Genitalia   Perineum - Normal. Bartholin's Gland - Bilateral - Normal. Clitoris - Normal.  Introitus: Characteristics - Normal. Discharge - None.  Labia Majora: Lesions - Bilateral - None. Characteristics - Bilateral - Normal.  Labia Minora: Lesions - Bilateral - None. Characteristics - Bilateral - Normal.  Urethra: Characteristics - Normal. Discharge - None.  [place] Gland - Bilateral - Normal.  Vulva: Characteristics - Normal. Lesions - None.    Speculum & Bimanual   Vagina:   Vaginal Wall: - Normal.  Vaginal Lesions - None. Vaginal Mucosa - Normal.  Cervix: Characteristics - No Motion tenderness. Discharge - None.  Uterus: Characteristics - Non Tender. Position - Midposition.  Adnexa: Characteristics - Bilateral - Tender. Masses - No Adnexal Masses.  Bladder - Normal.    Rectal   Anorectal Exam: External - normal external exam.    Lymphatic  General Lymphatics   Description - Normal .      IUD Paragard Insertion    Risks, benefits, alternatives, and indications of procedure reviewed with patient.  The patient voices clear understanding and desires to proceed.  Consent for a Paragard IUD was signed and witnessed by assistant.    Bimanual exam was performed, and the uterus was noted to be anteverted.  A speculum was placed in the vagina, and the cervix was visualized.  The cervix was then prepped with Betadine.  A single-tooth tenaculum was used to grasp the anterior lip of the cervix.   The Paragard IUD was then placed without difficulty.  The IUD strings were trimmed.  EBL was minimal.    The patient tolerated the procedure well.  There were no complications.  IUD information was given  to the patient.     1. Pre-procedural laboratory examination

## 2024-05-07 ENCOUNTER — OFFICE VISIT (OUTPATIENT)
Dept: INTERNAL MEDICINE CLINIC | Facility: CLINIC | Age: 29
End: 2024-05-07

## 2024-05-07 VITALS
BODY MASS INDEX: 34.66 KG/M2 | SYSTOLIC BLOOD PRESSURE: 105 MMHG | TEMPERATURE: 98 F | DIASTOLIC BLOOD PRESSURE: 69 MMHG | HEART RATE: 76 BPM | WEIGHT: 208 LBS | HEIGHT: 65 IN | OXYGEN SATURATION: 100 %

## 2024-05-07 DIAGNOSIS — N30.00 ACUTE CYSTITIS WITHOUT HEMATURIA: Primary | ICD-10-CM

## 2024-05-07 LAB
APPEARANCE: CLEAR
BILIRUBIN: NEGATIVE
GLUCOSE (URINE DIPSTICK): NEGATIVE MG/DL
KETONES (URINE DIPSTICK): NEGATIVE MG/DL
MULTISTIX LOT#: ABNORMAL NUMERIC
NITRITE, URINE: NEGATIVE
OCCULT BLOOD: NEGATIVE
PH, URINE: 5.5 (ref 4.5–8)
PROTEIN (URINE DIPSTICK): NEGATIVE MG/DL
SPECIFIC GRAVITY: 1 (ref 1–1.03)
URINE-COLOR: YELLOW
UROBILINOGEN,SEMI-QN: 0.2 MG/DL (ref 0–1.9)

## 2024-05-07 PROCEDURE — 3074F SYST BP LT 130 MM HG: CPT | Performed by: INTERNAL MEDICINE

## 2024-05-07 PROCEDURE — 3008F BODY MASS INDEX DOCD: CPT | Performed by: INTERNAL MEDICINE

## 2024-05-07 PROCEDURE — 81003 URINALYSIS AUTO W/O SCOPE: CPT | Performed by: INTERNAL MEDICINE

## 2024-05-07 PROCEDURE — 3078F DIAST BP <80 MM HG: CPT | Performed by: INTERNAL MEDICINE

## 2024-05-07 PROCEDURE — 99213 OFFICE O/P EST LOW 20 MIN: CPT | Performed by: INTERNAL MEDICINE

## 2024-05-07 NOTE — PROGRESS NOTES
Subjective:     Patient ID: Pooja Tillman is a 28 year old female.    Urinary Frequency   Associated symptoms include frequency.       History/Other:   She came in today due to urinary frequency burning and urgency.  According to her symptoms started on Friday and got better but her symptoms started again.  She does have urinary urgency and frequency.  She denies any abdominal pain, no back pain or nausea or vomiting.  Review of Systems   Constitutional: Negative.    HENT: Negative.     Eyes: Negative.    Respiratory: Negative.     Cardiovascular: Negative.    Gastrointestinal: Negative.    Endocrine: Negative.    Genitourinary:  Positive for frequency.   Musculoskeletal: Negative.    Allergic/Immunologic: Negative.    Neurological: Negative.    Hematological: Negative.    Psychiatric/Behavioral: Negative.       No current outpatient medications on file.     Allergies:No Known Allergies    Past Medical History:    Acne      History reviewed. No pertinent surgical history.   Family History   Problem Relation Age of Onset    Hypertension Father     High Cholesterol Father     Hypertension Mother     High Cholesterol Mother       Social History:   Social History     Socioeconomic History    Marital status: Single   Tobacco Use    Smoking status: Never     Passive exposure: Never    Smokeless tobacco: Never   Vaping Use    Vaping status: Never Used   Substance and Sexual Activity    Alcohol use: No    Drug use: Never    Sexual activity: Yes     Partners: Male     Birth control/protection: Condom   Other Topics Concern    Reaction to local anesthetic No        Objective:   Physical Exam  Vitals and nursing note reviewed.   Constitutional:       Appearance: Normal appearance.   HENT:      Head: Normocephalic and atraumatic.   Cardiovascular:      Rate and Rhythm: Normal rate and regular rhythm.      Pulses: Normal pulses.      Heart sounds: Normal heart sounds.   Pulmonary:      Effort: Pulmonary effort is normal.       Breath sounds: Normal breath sounds.   Abdominal:      Palpations: Abdomen is soft.   Musculoskeletal:         General: Normal range of motion.      Cervical back: Normal range of motion and neck supple.   Skin:     General: Skin is warm.   Neurological:      Mental Status: She is alert. Mental status is at baseline.         Assessment & Plan:   1. Acute cystitis without hematuria      UA noted will send urine for culture I did advise her to drink plenty of fluids always wipe front to back never back to front, will follow-up results  Orders Placed This Encounter   Procedures    Urine Culture, Routine [E]       Meds This Visit:  Requested Prescriptions      No prescriptions requested or ordered in this encounter       Imaging & Referrals:  None

## 2024-10-07 ENCOUNTER — TELEPHONE (OUTPATIENT)
Dept: INTERNAL MEDICINE CLINIC | Facility: CLINIC | Age: 29
End: 2024-10-07

## 2024-10-07 DIAGNOSIS — Z00.00 ANNUAL PHYSICAL EXAM: Primary | ICD-10-CM

## 2024-10-07 DIAGNOSIS — E66.9 OBESITY (BMI 30-39.9): ICD-10-CM

## 2024-10-07 NOTE — TELEPHONE ENCOUNTER
Orders placed for STI also the urine test for STI we can do on the day of the office visit with her PCP.

## 2024-10-07 NOTE — TELEPHONE ENCOUNTER
Patient scheduled office visit for 10/10/24 to discuss hair loss. Please advise if labs needed prior.

## 2024-10-07 NOTE — TELEPHONE ENCOUNTER
Dr Montoya, please advise if you are able to add the additional labs the pt requested.     Thanks     Pooja Simms Rn Triage (supporting Jenna Cloud RN)28 minutes ago (1:59 PM)     Jackie West,     Can you please advise me on how to do this? Can I come any time during the day into [facility] and just ask for my blood drawn? How long should I not fast for? Is there any other tips that I should follow?     Also, I would like to test for nutrition, hormones, STDs, etc. Is that possible in one visit?      Thank you in advance.

## 2024-10-07 NOTE — TELEPHONE ENCOUNTER
Verified name and .    Patient has upcoming appointment with Dr. Man.    She will discuss request for other blood tests during upcoming office visit after evaluation.    Future Appointments   Date Time Provider Department Center   10/10/2024 11:45 AM Angi Man MD [facility]

## 2024-10-10 ENCOUNTER — OFFICE VISIT (OUTPATIENT)
Dept: INTERNAL MEDICINE CLINIC | Facility: CLINIC | Age: 29
End: 2024-10-10

## 2024-10-10 VITALS
TEMPERATURE: 98 F | HEART RATE: 78 BPM | OXYGEN SATURATION: 97 % | BODY MASS INDEX: 34.16 KG/M2 | DIASTOLIC BLOOD PRESSURE: 80 MMHG | WEIGHT: 205 LBS | HEIGHT: 65 IN | SYSTOLIC BLOOD PRESSURE: 116 MMHG

## 2024-10-10 DIAGNOSIS — E55.9 VITAMIN D DEFICIENCY: ICD-10-CM

## 2024-10-10 DIAGNOSIS — R53.83 FATIGUE, UNSPECIFIED TYPE: ICD-10-CM

## 2024-10-10 DIAGNOSIS — L65.9 HAIR LOSS: Primary | ICD-10-CM

## 2024-10-10 DIAGNOSIS — D50.8 OTHER IRON DEFICIENCY ANEMIA: ICD-10-CM

## 2024-10-10 LAB
BASOPHILS # BLD AUTO: 0.06 X10(3) UL (ref 0–0.2)
BASOPHILS NFR BLD AUTO: 0.6 %
DEPRECATED HBV CORE AB SER IA-ACNC: 35 NG/ML
DEPRECATED RDW RBC AUTO: 43.3 FL (ref 35.1–46.3)
EOSINOPHIL # BLD AUTO: 0.18 X10(3) UL (ref 0–0.7)
EOSINOPHIL NFR BLD AUTO: 1.8 %
ERYTHROCYTE [DISTWIDTH] IN BLOOD BY AUTOMATED COUNT: 13.5 % (ref 11–15)
HCT VFR BLD AUTO: 40 %
HGB BLD-MCNC: 13.4 G/DL
IMM GRANULOCYTES # BLD AUTO: 0.04 X10(3) UL (ref 0–1)
IMM GRANULOCYTES NFR BLD: 0.4 %
IRON SATN MFR SERPL: 14 %
IRON SERPL-MCNC: 59 UG/DL
LYMPHOCYTES # BLD AUTO: 2.43 X10(3) UL (ref 1–4)
LYMPHOCYTES NFR BLD AUTO: 23.6 %
MCH RBC QN AUTO: 29.5 PG (ref 26–34)
MCHC RBC AUTO-ENTMCNC: 33.5 G/DL (ref 31–37)
MCV RBC AUTO: 87.9 FL
MONOCYTES # BLD AUTO: 0.7 X10(3) UL (ref 0.1–1)
MONOCYTES NFR BLD AUTO: 6.8 %
NEUTROPHILS # BLD AUTO: 6.87 X10 (3) UL (ref 1.5–7.7)
NEUTROPHILS # BLD AUTO: 6.87 X10(3) UL (ref 1.5–7.7)
NEUTROPHILS NFR BLD AUTO: 66.8 %
PLATELET # BLD AUTO: 279 10(3)UL (ref 150–450)
RBC # BLD AUTO: 4.55 X10(6)UL
TIBC SERPL-MCNC: 417 UG/DL (ref 250–425)
TRANSFERRIN SERPL-MCNC: 280 MG/DL (ref 250–380)
TSI SER-ACNC: 3.15 MIU/ML (ref 0.55–4.78)
VIT B12 SERPL-MCNC: 528 PG/ML (ref 211–911)
VIT D+METAB SERPL-MCNC: 10 NG/ML (ref 30–100)
WBC # BLD AUTO: 10.3 X10(3) UL (ref 4–11)

## 2024-10-10 PROCEDURE — 36415 COLL VENOUS BLD VENIPUNCTURE: CPT | Performed by: INTERNAL MEDICINE

## 2024-10-10 PROCEDURE — 99214 OFFICE O/P EST MOD 30 MIN: CPT | Performed by: INTERNAL MEDICINE

## 2024-10-10 PROCEDURE — 3008F BODY MASS INDEX DOCD: CPT | Performed by: INTERNAL MEDICINE

## 2024-10-10 PROCEDURE — 3079F DIAST BP 80-89 MM HG: CPT | Performed by: INTERNAL MEDICINE

## 2024-10-10 PROCEDURE — 3074F SYST BP LT 130 MM HG: CPT | Performed by: INTERNAL MEDICINE

## 2024-10-10 NOTE — PROGRESS NOTES
Subjective:     Patient ID: Pooja Tillman is a 28 year old female.    HPI    History/Other: she came in today to discuss hair loss \"this is ongoing for some time.  She tried biotin in the past     Review of Systems   Constitutional: Negative.    HENT: Negative.     Eyes: Negative.    Respiratory: Negative.     Cardiovascular: Negative.    Gastrointestinal: Negative.    Endocrine: Negative.    Genitourinary: Negative.    Musculoskeletal: Negative.    Skin: Negative.    Allergic/Immunologic: Negative.    Neurological: Negative.    Hematological: Negative.    Psychiatric/Behavioral: Negative.       No current outpatient medications on file.     Allergies:Allergies[1]    Past Medical History:    Acne    Obesity      No past surgical history on file.   Family History   Problem Relation Age of Onset    Hypertension Father     High Cholesterol Father     Hypertension Mother     High Cholesterol Mother       Social History:   Social History     Socioeconomic History    Marital status: Single   Tobacco Use    Smoking status: Never     Passive exposure: Never    Smokeless tobacco: Never   Vaping Use    Vaping status: Never Used   Substance and Sexual Activity    Alcohol use: Never    Drug use: Never    Sexual activity: Yes     Partners: Male     Birth control/protection: Condom   Other Topics Concern    Reaction to local anesthetic No        Objective:   Physical Exam  Vitals and nursing note reviewed.   Constitutional:       Appearance: Normal appearance.   HENT:      Head: Normocephalic and atraumatic.   Cardiovascular:      Rate and Rhythm: Normal rate and regular rhythm.      Pulses: Normal pulses.      Heart sounds: Normal heart sounds.   Pulmonary:      Effort: Pulmonary effort is normal.      Breath sounds: Normal breath sounds.   Abdominal:      General: Bowel sounds are normal.      Palpations: Abdomen is soft.   Musculoskeletal:         General: Normal range of motion.      Cervical back: Normal range of motion  and neck supple.   Skin:     General: Skin is warm.   Neurological:      Mental Status: She is alert. Mental status is at baseline.   Psychiatric:         Mood and Affect: Mood normal.         Assessment & Plan:   1.hair   loss- I will order CBC thyroid panel advised to start taking  biotin  2 Other iron deficiency anemia    3. Fatigue, unspecified type    4. Vitamin D deficiency        Orders Placed This Encounter   Procedures    CBC W Differential W Platelet [E]    TSH W Reflex To Free T4 [E]    Vitamin D [E]    Vitamin B12 [E]    Iron And Tibc [E]    Ferritin       Meds This Visit:  Requested Prescriptions      No prescriptions requested or ordered in this encounter       Imaging & Referrals:  None            [1] No Known Allergies

## 2024-10-17 ENCOUNTER — PATIENT MESSAGE (OUTPATIENT)
Dept: INTERNAL MEDICINE CLINIC | Facility: CLINIC | Age: 29
End: 2024-10-17

## 2025-01-22 RX ORDER — ERGOCALCIFEROL 1.25 MG/1
50000 CAPSULE, LIQUID FILLED ORAL WEEKLY
Qty: 12 CAPSULE | Refills: 0 | Status: SHIPPED | OUTPATIENT
Start: 2025-01-22

## 2025-01-22 NOTE — TELEPHONE ENCOUNTER
Please review; protocol failed/No Protocol    Last Office Visit: 10/10/2024    Last Vitamin D labs: 10/10/2024      Component  Ref Range & Units 10/10/24 12:13 PM   Vitamin D, 25OH, Total  30.0 - 100.0 ng/mL 10.0 Low         Requested Prescriptions   Pending Prescriptions Disp Refills    ERGOCALCIFEROL 1.25 MG (22071 UT) Oral Cap [Pharmacy Med Name: VITAMIN D2 50,000IU (ERGO) CAP RX] 12 capsule 0     Sig: TAKE 1 CAPSULE BY MOUTH 1 TIME A WEEK       There is no refill protocol information for this order          Recent Outpatient Visits              3 months ago Hair loss    [facility], Eliot Cole Arlinda, MD    Office Visit    8 months ago [facility]stitis without hematuria    [facility]    Office Visit    9 months ago Pre-procedural laboratory examination    [facility], [facility] - OB[facility]GYN Shari Wallace MD    Office Visit    11 months ago Women's annual routine gynecological examination    [facility], [address] [place] [address] Shari Wallace MD    Office Visit    1 year ago Encounter for counseling regarding contraception    [facility]    Office Visit

## 2025-02-06 ENCOUNTER — OFFICE VISIT (OUTPATIENT)
Dept: OBGYN CLINIC | Facility: CLINIC | Age: 30
End: 2025-02-06
Payer: COMMERCIAL

## 2025-02-06 VITALS
HEIGHT: 65 IN | BODY MASS INDEX: 38.24 KG/M2 | DIASTOLIC BLOOD PRESSURE: 78 MMHG | WEIGHT: 229.5 LBS | SYSTOLIC BLOOD PRESSURE: 112 MMHG

## 2025-02-06 DIAGNOSIS — Z01.419 ENCOUNTER FOR WELL WOMAN EXAM WITH ROUTINE GYNECOLOGICAL EXAM: Primary | ICD-10-CM

## 2025-02-06 DIAGNOSIS — Z12.4 ENCOUNTER FOR PAPANICOLAOU SMEAR FOR CERVICAL CANCER SCREENING: ICD-10-CM

## 2025-02-06 DIAGNOSIS — Z30.431 IUD CHECK UP: ICD-10-CM

## 2025-02-06 PROCEDURE — 3008F BODY MASS INDEX DOCD: CPT | Performed by: OBSTETRICS & GYNECOLOGY

## 2025-02-06 PROCEDURE — 3078F DIAST BP <80 MM HG: CPT | Performed by: OBSTETRICS & GYNECOLOGY

## 2025-02-06 PROCEDURE — 99395 PREV VISIT EST AGE 18-39: CPT | Performed by: OBSTETRICS & GYNECOLOGY

## 2025-02-06 PROCEDURE — 3074F SYST BP LT 130 MM HG: CPT | Performed by: OBSTETRICS & GYNECOLOGY

## 2025-02-06 NOTE — PROGRESS NOTES
[facility]  Obstetrics and Gynecology  Gynecology Visit    Chief Complaint   Patient presents with    Annual           Pooja MARGARITA Tillman is a 29 year old female who presents for annual exam.    LMP: 2025.    Menses regular: yes.    Menstrual flow normal: yes.    Birth control or HRT:  Paragard.   Insertion: 2024  Last Pap Smear: 2024.  Any history of abnormal paps: No hx abn   Last MMG: n/a  Any Medication Refills needed today?: no  Sleep: 7-8 hours.    Diet: poor.    Exercise: walking.   Screening labs/Blood work today: no.     Colonoscopy (if over 46 y/o): n/a.   Gardasil:(age 9-46 y/o) up to date.   Genetic Cancer screen (if indicated): no.   Flu (Aug-April): up to date.TDAP (every 10 years) n/a.      Additional Problems/concerns: Patient complains of vaginal lump x1 day.      Next Appt: n/a    Immunization History   Administered Date(s) Administered    Covid-19 Vaccine Pfizer 30 mcg/0.3 ml 2021, 2021, 2021    DTAP 1997, 1997, 1997, 1998, 2002    FLULAVAL 6 months & older 0.5 ml Prefilled syringe (41347) 2021    FLUMIST NASAL 2 YR-49 YRS (51523) 2013    FLUZONE 6 months and older PFS 0.5 ml (95012) 2014    Flublok Quad Influenza Vaccine (42460) 10/18/2023    HEP A 2007, 2011    Hep B, Unspecified Formulation 1996, 1997, 1997    IPV 1997, 1997, 1997, 2002    MMR 1998, 2002    Meningococcal Vaccine 2013    Meningococcal-Menveo 2month-55yr 2008    TDAP 2008    Varicella 2002, 2007         Current Outpatient Medications:     ergocalciferol 1.25 MG (19752 UT) Oral Cap, Take 1 capsule (50,000 Units total) by mouth once a week., Disp: 12 capsule, Rfl: 0    Allergies[1]    OB History    Para Term  AB Living   0 0 0 0 0 0   SAB IAB Ectopic Multiple Live Births   0 0 0 0 0       Gyn History       No data recorded       No data  to display                    Past Medical History:    Acne    Obesity       History reviewed. No pertinent surgical history.    Family History   Problem Relation Age of Onset    Hypertension Father     High Cholesterol Father     Hypertension Mother     High Cholesterol Mother         Tobacco  Allergies  Med Hx  Surg Hx  Fam Hx  Soc Hx        Social History     Socioeconomic History    Marital status: Single     Spouse name: Not on file    Number of children: Not on file    Years of education: Not on file    Highest education level: Not on file   Occupational History    Not on file   Tobacco Use    Smoking status: Never     Passive exposure: Never    Smokeless tobacco: Never   Vaping Use    Vaping status: Never Used   Substance and Sexual Activity    Alcohol use: Never    Drug use: Never    Sexual activity: Yes     Partners: Male     Birth control/protection: Condom   Other Topics Concern    Grew up on a farm Not Asked    History of tanning Not Asked    Outdoor occupation Not Asked    Breast feeding Not Asked    Reaction to local anesthetic No   Social History Narrative    Not on file     Social Drivers of Health     Food Insecurity: Not on file   Transportation Needs: Not on file   Stress: Not on file   Housing Stability: Not on file   /78   Ht 5' 5\" (1.651 m)   Wt 229 lb 8 oz (104.1 kg)   LMP 01/27/2025 (Approximate)   BMI 38.19 kg/m²     Wt Readings from Last 3 Encounters:   02/06/25 229 lb 8 oz (104.1 kg)   10/10/24 205 lb (93 kg)   05/07/24 208 lb (94.3 kg)         Health Maintenance   Topic Date Due    Influenza Vaccine (1) 08/01/2021    Screen for Cervical Cancer 11/05/2021    DTaP,Tdap and Td Vaccines (3 - Td or Tdap) 03/18/2025    Hepatitis C Screening Completed    HIV Screening Completed    COVID-19 Vaccine Completed     Review of Systems   General: Present- Feeling well. Not Present- Chills, Fever, Weight Gain and Weight Loss.  HEENT: Not Present- Headache and Sore Throat.  Respiratory:  Not Present- Cough, Difficulty Breathing, Hemoptysis and Sputum Production.  Cardiovascular: Not Present- Chest Pain, Elevated Blood Pressure, Fainting / Blacking Out and Shortness of Breath.  Gastrointestinal: Not Present- Constipation, Diarrhea, Nausea and Vomiting.  Female Genitourinary: Not Present- Discharge, Dysuria and Frequency.  Musculoskeletal: Not Present- Leg Cramps and Swelling of Extremities.  Neurological: Not Present- Dizziness and Headaches.  Psychiatric: Not Present- Anxiety and Depression.  Endocrine: Not Present- Appetite Changes, Hair Changes and Thyroid Problems.  Hematology: Not Present- Easy Bruising and Excessive bleeding.  All other systems negative     Physical Exam The physical exam findings are as follows:     General   Mental Status - Alert. General Appearance - Cooperative. Orientation - Oriented X4. Build & Nutrition - Well nourished.    Head and Neck  Thyroid   Gland Characteristics - normal size and consistency.    Chest and Lung Exam   Inspection:   Chest Wall: - Normal.  Percussion:   Quality and Intensity: - Percussion normal.  Palpation: - Palpation normal.  Auscultation:   Breath sounds: - Normal.  Adventitious sounds: - No Adventitious sounds.    Breast   Nipples: Characteristics - Bilateral - Normal. Discharge - Bilateral - None.  Breast - Bilateral - Symmetric.    Cardiovascular   Auscultation: Rhythm - Regular. Heart Sounds - Normal heart sounds.  Murmurs & Other Heart Sounds: Auscultation of the heart reveals - No Murmurs.    Abdomen   Inspection: Inspection of the abdomen reveals - No Hernias. Incisional scars - no incisional scars.  Palpation/Percussion: Palpation and Percussion of the abdomen reveal - Non Tender and No Palpable abdominal masses.  Liver: - Normal.  Auscultation: Auscultation of the abdomen reveals - Bowel sounds normal.    Female Genitourinary     External Genitalia   Perineum - Normal. Bartholin's Gland - Bilateral - Normal. Clitoris -  Normal.  Introitus: Characteristics - No Cystocele, Enterocele or Rectocele. Discharge - None.  Labia Majora: Lesions - Bilateral - None. Characteristics - Bilateral - Normal.  Right sebaceous cyst   Labia Minora: Lesions - Bilateral - None. Characteristics - Bilateral - Normal.  Urethra: Characteristics - Normal. Discharge - None.  Devine Gland - Bilateral - Normal.  Vulva: Characteristics - Normal. Lesions - None.    Speculum & Bimanual   Vagina:   Vaginal Wall: - Normal.  Vaginal Lesions - None. Vaginal Mucosa - Normal.  Cervix: Characteristics - No Motion tenderness. Discharge - None. IUD at os   Uterus: Characteristics - Normal. Position - Midposition.  Adnexa: Characteristics - Bilateral - Normal. Masses - No Adnexal Masses.  Wet Mount: pH - 3.8-4.2. Vaginal discharge - Clear  and Thin. Amine Odor - Absent. Main patient complaints - Discharge.     Rectal   Anorectal Exam: External - normal external exam.    Peripheral Vascular   Upper Extremity:   Palpation: - Pulses bilaterally normal.  Lower Extremity: Inspection - Bilateral - Inspection Normal.  Palpation: Edema - Bilateral - No edema.    Neurologic   Mental Status: - Normal.    Lymphatic  General Lymphatics   Description - Normal .       1. Encounter for well woman exam with routine gynecological exam    2. Encounter for Papanicolaou smear for cervical cancer screening    3. IUD check up                              [1] No Known Allergies

## 2025-05-20 ENCOUNTER — OFFICE VISIT (OUTPATIENT)
Dept: OBGYN CLINIC | Facility: CLINIC | Age: 30
End: 2025-05-20

## 2025-05-20 VITALS
BODY MASS INDEX: 38.82 KG/M2 | DIASTOLIC BLOOD PRESSURE: 84 MMHG | HEIGHT: 65 IN | SYSTOLIC BLOOD PRESSURE: 120 MMHG | WEIGHT: 233 LBS

## 2025-05-20 DIAGNOSIS — Z30.431 IUD CHECK UP: Primary | ICD-10-CM

## 2025-05-20 DIAGNOSIS — N76.0 ACUTE VAGINITIS: ICD-10-CM

## 2025-05-20 DIAGNOSIS — Z11.3 SCREEN FOR STD (SEXUALLY TRANSMITTED DISEASE): ICD-10-CM

## 2025-05-20 PROCEDURE — 3074F SYST BP LT 130 MM HG: CPT | Performed by: OBSTETRICS & GYNECOLOGY

## 2025-05-20 PROCEDURE — 99213 OFFICE O/P EST LOW 20 MIN: CPT | Performed by: OBSTETRICS & GYNECOLOGY

## 2025-05-20 PROCEDURE — 3008F BODY MASS INDEX DOCD: CPT | Performed by: OBSTETRICS & GYNECOLOGY

## 2025-05-20 PROCEDURE — 3079F DIAST BP 80-89 MM HG: CPT | Performed by: OBSTETRICS & GYNECOLOGY

## 2025-05-20 NOTE — PROGRESS NOTES
Chief Complaint   Patient presents with    Follow - Up         Pooja Tillman is a 29 year old female who presents for on and off cramping and feels IUD more than usual.    LMP: 2025.    Menses regular: yes.    Menstrual flow normal: normal.    Birth control or HRT: Paragard.   Refill 0  Last Pap Smear: 2025 . Any history of abnormal paps: no hx abn   Gardasil:(age 9-46 y/o) unsure.   Any medication refills needed today?: no    Problems/concerns: no other concerns.      Next Appt: will call to schedule annual        Immunization History   Administered Date(s) Administered    Covid-19 Vaccine Pfizer 30 mcg/0.3 ml 2021, 2021, 2021    DTAP 1997, 1997, 1997, 1998, 2002    FLULAVAL 6 months & older 0.5 ml Prefilled syringe (24842) 2021    FLUMIST NASAL 2 YR-49 YRS (15031) 2013    FLUZONE 6 months and older PFS 0.5 ml (96698) 2014    Flublok Quad Influenza Vaccine (41266) 10/18/2023    HEP A 2007, 2011    Hep B, Unspecified Formulation 1996, 1997, 1997    IPV 1997, 1997, 1997, 2002    Influenza 2024    MMR 1998, 2002    Meningococcal Vaccine 2013    Meningococcal-Menveo 2month-55yr 2008    TDAP 2008    Varicella 2002, 2007       Medications - Current[1]    Allergies[2]    OB History    Para Term  AB Living   0 0 0 0 0 0   SAB IAB Ectopic Multiple Live Births   0 0 0 0 0       Gyn History      No data recorded      Latest Ref Rng & Units 2025     5:18 PM 2024     7:05 PM   RECENT PAP RESULTS   INTERPRETATION/RESULT: Negative for intraepithelial lesion or malignancy Negative for intraepithelial lesion or malignancy  NEGATIVE FOR INTRAEPITHELIAL LESION OR MALIGNANCY.            Past Medical History[3]    Past Surgical History[4]    Family History[5]     Tobacco  Allergies  Meds  Med Hx  Surg Hx  Fam Hx  Soc Hx         Social History     Socioeconomic History    Marital status: Single     Spouse name: Not on file    Number of children: Not on file    Years of education: Not on file    Highest education level: Not on file   Occupational History    Not on file   Tobacco Use    Smoking status: Never     Passive exposure: Never    Smokeless tobacco: Never   Vaping Use    Vaping status: Never Used   Substance and Sexual Activity    Alcohol use: Never    Drug use: Never    Sexual activity: Yes     Partners: Male     Birth control/protection: Condom   Other Topics Concern    Grew up on a farm Not Asked    History of tanning Not Asked    Outdoor occupation Not Asked    Breast feeding Not Asked    Reaction to local anesthetic No   Social History Narrative    Not on file     Social Drivers of Health     Food Insecurity: Not on file   Transportation Needs: Not on file   Stress: Not on file   Housing Stability: Not on file     /84   Ht 5' 5\" (1.651 m)   Wt 233 lb (105.7 kg)   LMP 05/07/2025 (Approximate)   BMI 38.77 kg/m²     Wt Readings from Last 3 Encounters:   05/20/25 233 lb (105.7 kg)   02/06/25 229 lb 8 oz (104.1 kg)   10/10/24 205 lb (93 kg)         Health Maintenance   Topic Date Due    Influenza Vaccine (1) 08/01/2021    Screen for Cervical Cancer 11/05/2021    DTaP,Tdap and Td Vaccines (3 - Td or Tdap) 03/18/2025    Hepatitis C Screening Completed    HIV Screening Completed    COVID-19 Vaccine Completed     Review of Systems   General: Present- Feeling well. Not Present- Chills, Fever, Weight Gain and Weight Loss.  HEENT: Not Present- Headache and Sore Throat.  Respiratory: Not Present- Cough, Difficulty Breathing, Hemoptysis and Sputum Production.  Cardiovascular: Not Present- Chest Pain, Elevated Blood Pressure, Fainting / Blacking Out and Shortness of Breath.  Gastrointestinal: Not Present- Constipation, Diarrhea, Nausea and Vomiting.  Female Genitourinary: Not Present- Discharge, Dysuria and  Frequency.  Musculoskeletal: Not Present- Leg Cramps and Swelling of Extremities.  Neurological: Not Present- Dizziness and Headaches.  Psychiatric: Not Present- Anxiety and Depression.  Endocrine: Not Present- Appetite Changes, Hair Changes and Thyroid Problems.  Hematology: Not Present- Easy Bruising and Excessive bleeding.  All other systems negative     Physical Exam The physical exam findings are as follows:     General   Mental Status - Alert. General Appearance - Cooperative. Orientation - Oriented X4. Build & Nutrition - Well nourished.    Head and Neck  Thyroid   Gland Characteristics - normal size and consistency.    Chest and Lung Exam   Inspection:   Chest Wall: - Normal.  Percussion:   Quality and Intensity: - Percussion normal.  Palpation: - Palpation normal.  Auscultation:   Breath sounds: - Normal.  Adventitious sounds: - No Adventitious sounds.    Breast   Nipples: Characteristics - Bilateral - Normal. Discharge - Bilateral - None.  Breast - Bilateral - Symmetric.    Cardiovascular   Auscultation: Rhythm - Regular. Heart Sounds - Normal heart sounds.  Murmurs & Other Heart Sounds: Auscultation of the heart reveals - No Murmurs.    Abdomen   Inspection: Inspection of the abdomen reveals - No Hernias. Incisional scars - no incisional scars.  Palpation/Percussion: Palpation and Percussion of the abdomen reveal - Non Tender and No Palpable abdominal masses.  Liver: - Normal.  Auscultation: Auscultation of the abdomen reveals - Bowel sounds normal.    Female Genitourinary     External Genitalia   Perineum - Normal. Bartholin's Gland - Bilateral - Normal. Clitoris - Normal.  Introitus: Characteristics - No Cystocele, Enterocele or Rectocele. Discharge - None.  Labia Majora: Lesions - Bilateral - None. Characteristics - Bilateral - Normal.  Labia Minora: Lesions - Bilateral - None. Characteristics - Bilateral - Normal.  Urethra: Characteristics - Normal. Discharge - None.  Weiner Gland - Bilateral -  Normal.  Vulva: Characteristics - Normal. Lesions - None.    Speculum & Bimanual   Vagina:   Vaginal Wall: - Normal.  Vaginal Lesions - None. Vaginal Mucosa - Normal.  Cervix: Characteristics - No Motion tenderness. Discharge - None.iud at os strings trimmed   Uterus: Characteristics - Normal. Position - Midposition.  Adnexa: Characteristics - Bilateral - Normal. Masses - No Adnexal Masses.  Wet Mount: pH - 3.8-4.2. Vaginal discharge - Clear  and Thin. Amine Odor - Absent. Main patient complaints - Discharge.     Rectal   Anorectal Exam: External - normal external exam.    Peripheral Vascular   Upper Extremity:   Palpation: - Pulses bilaterally normal.  Lower Extremity: Inspection - Bilateral - Inspection Normal.  Palpation: Edema - Bilateral - No edema.    Neurologic   Mental Status: - Normal.    Lymphatic  General Lymphatics   Description - Normal .     Vaginitis is an inflammation of the vagina. Vaginitis may cause itching, burning, a bad odor, or a large amount of discharge. There are many possible causes of vaginitis, and the type of treatment depends on the cause.  Common types of vaginitis include  yeast infections  bacterial vaginosis (BV)  trichomoniasis  atrophic vaginitis      Vaginitis is the general term for disorders of the vagina caused by infection, inflammation, or changes in the normal vaginal rafi. Symptoms include abnormal vaginal discharge, odor, itching, and/or discomfort.     Vaginal pH may be altered (usually to a higher pH) by contamination with lubricating gels, semen, blood, douches, and intravaginal medications     Evaluation is undertaken by ph and vaginal culture. Once diagnosis is made treatment will be discussed. Medications used are specific to the cause and may include antibiotics or antifungals available in both oral and vaginal preparations. After any infection is cleared, efforts will be undertaken to re-establish the normal vaginal rafi of the vagina with Pro-biotics and then  prevention for future destruction of this rafi with harsh chemicals in soaps, perfumes, douches and lubricants.     Vulvar Care     Cleaning:  -use plain warm (not hot) water (no soap) to wash if needed or can take Sitz bath (see below)  -use water, fingers, & only very gentle, fragrance-free, moisturizing cleanser      (e.g. Cetaphil or CeraVe hydrating or gentle cleansers meant for eczema/psoriasis & faces)  -only pat dry gently (no rubbing)     Sitz baths:  -soothing and cleansing  -Get a Sitz Bath from [employer] or [employer]--fits over toilet, you can fill with water to soak vulva without having to get in bathtub  -Use 1-3 times per day for ten minutes at a time  -Pat dry, apply thin layer of vaseline to protect the skin     Protection/Prevention:  -goal is to maintain an intact, moist (non-dehydrated) skin barrier  -need to prevent irritation & over-drying of skin that can lead to micro-tears, cracking, and itching, pain, & bleeding.  -do not scratch or wipe hard (mechanical injury)  -avoid strong chemicals/fragrances (fragrance free soap & detergents, avoid fabric softeners)  -avoid other irritants (e.g. sweat, leaked urine, leaked stool)  -avoid excessive friction (no thongs, always use lubricant for intercourse, daily barrier cream or ointment)  -breathable underwear, change underwear if sweaty/wet, no underwear while sleeping if possible.  -DO USE a barrier product for protection       (e.g. Aquaphor, vaseline, A&D ointment, Zinc oxide, diaper rash cream) at least once daily  -DO NOT use any instrument (including fingers) in the anus first and then in the vagina. This will cause a bacterial infection of the vagina.     Treatment:  -BEST TREATMENT IS PREVENTION  -ointments are generally more potent than creams  -use just a thin layer  -during treatment (usually at least 2 wk) it is best to avoid intercourse to avoid trauma to the skin  -if diagnosed with chronic inflammatory skin condition (e.g. lichen  sclerosis)         Most important is avoiding scratching & irritants         Work with gynecologist to form a steroid regimen for long term use         Often will need daily strong topical steroid (prescription) for at least 12 weeks.           Best to try to taper down to 2-3 times per week or weekly if able & can increase use in a flare  -if sensitive/dry skin         Add back some oils &/or moisture on a regular basis         Coconut oil is pure (no irritants) & contains ceramides (fatty acids) that are             important for maintaining skin barrier         Hyaluronic acid (there are suppositories for intravaginal use e.g Revaree)         Vaginal moisturizer products can be used topically as well (e.g. Replens, Luvena)         Still recommend a barrier product in addition (on top of the above)     If you are tempted to scratch:  -place ice pack on area for 15-20 minutes & distract yourself.  -if needed can (sparingly) place a thin layer of lidocaine ointment or cream        (e.g. Bikini-zone, etc over the counter)  -can take an oral anti-histamine (e.g. Benadryl, Claritin, Zyrtec, etc)  -can also use a topical steroid (hydrocortisone ointment over the counter) for a few days        Take care - chronic steroid use can cause skin thinning & can worsen your problem.        1. IUD check up    2. Acute vaginitis                      [1] No current outpatient medications on file.  [2] No Known Allergies  [3]   Past Medical History:   Acne    Obesity   [4] History reviewed. No pertinent surgical history.  [5]   Family History  Problem Relation Age of Onset    Hypertension Father     High Cholesterol Father     Hypertension Mother     High Cholesterol Mother

## 2025-05-21 LAB
BV BACTERIA DNA VAG QL NAA+PROBE: NEGATIVE
C GLABRATA DNA VAG QL NAA+PROBE: NEGATIVE
C KRUSEI DNA VAG QL NAA+PROBE: NEGATIVE
C TRACH DNA SPEC QL NAA+PROBE: NEGATIVE
CANDIDA DNA VAG QL NAA+PROBE: NEGATIVE
N GONORRHOEA DNA SPEC QL NAA+PROBE: NEGATIVE
T VAGINALIS DNA VAG QL NAA+PROBE: NEGATIVE

## 2025-07-15 ENCOUNTER — LAB ENCOUNTER (OUTPATIENT)
Dept: LAB | Facility: HOSPITAL | Age: 30
End: 2025-07-15
Attending: INTERNAL MEDICINE
Payer: COMMERCIAL

## 2025-07-15 ENCOUNTER — NURSE TRIAGE (OUTPATIENT)
Dept: INTERNAL MEDICINE CLINIC | Facility: CLINIC | Age: 30
End: 2025-07-15

## 2025-07-15 DIAGNOSIS — R35.0 URINARY FREQUENCY: ICD-10-CM

## 2025-07-15 DIAGNOSIS — R35.0 URINARY FREQUENCY: Primary | ICD-10-CM

## 2025-07-15 LAB
BILIRUB UR QL CFM: NEGATIVE
GLUCOSE UR-MCNC: NORMAL MG/DL
KETONES UR-MCNC: NEGATIVE MG/DL
LEUKOCYTE ESTERASE UR QL STRIP.AUTO: 500
PH UR: 6 [PH] (ref 5–8)
SP GR UR STRIP: 1.01 (ref 1–1.03)
UROBILINOGEN UR STRIP-ACNC: 4
WBC #/AREA URNS AUTO: >50 /HPF
WBC CLUMPS UR QL AUTO: PRESENT /HPF

## 2025-07-15 PROCEDURE — 81001 URINALYSIS AUTO W/SCOPE: CPT

## 2025-07-15 PROCEDURE — 87086 URINE CULTURE/COLONY COUNT: CPT

## 2025-07-15 PROCEDURE — 87186 SC STD MICRODIL/AGAR DIL: CPT

## 2025-07-15 PROCEDURE — 87088 URINE BACTERIA CULTURE: CPT

## 2025-07-15 NOTE — TELEPHONE ENCOUNTER
Action Requested: Summary for Provider     []  Critical Lab, Recommendations Needed  [] Need Additional Advice  []   FYI    [x]   Need Orders  [] Need Medications Sent to Pharmacy  []  Other     SUMMARY: Patient reports of frequency and burning sensation with urination that started yesterday morning. She scheduled an appointment for video visit with PCP today but cancelled it since she thought she is starting to feel better. However, symptoms persisting. No blood in the urine. Denies back or flank pain. States she is drinking lots of water. Patient asking to get prescription for antibiotic.    States she can come in the lab to get her urine tested, requesting order.    Care advice given per protocol. Patient verbalized understanding and agreed with plan of care.     Dr. Angi Man please advise. Pended order for review and approval if appropriate.     Reason for call: Urinary Symptoms  Onset: 7/14    Reason for Disposition   Urinating more frequently than usual (i.e., frequency)    Protocols used: Urinary Symptoms-A-OH

## 2025-07-16 ENCOUNTER — TELEPHONE (OUTPATIENT)
Dept: INTERNAL MEDICINE CLINIC | Facility: CLINIC | Age: 30
End: 2025-07-16

## 2025-07-16 DIAGNOSIS — N30.00 ACUTE CYSTITIS WITHOUT HEMATURIA: Primary | ICD-10-CM

## 2025-07-16 RX ORDER — NITROFURANTOIN 25; 75 MG/1; MG/1
100 CAPSULE ORAL 2 TIMES DAILY
Qty: 10 CAPSULE | Refills: 0 | Status: SHIPPED | OUTPATIENT
Start: 2025-07-16 | End: 2025-07-21

## 2025-07-16 NOTE — TELEPHONE ENCOUNTER
On-call note:    Received after hours page on 7/16/2025 at 1832.    Problem: UA from 7/15 with dysuria showing 2+ nitr, with 1+ blood, leuk est, trace protein - cx growing >100,000 E Coli. Called PMD office without response re: abx and has started to have flank pain.     Recommendations: confirmed preferred pharmacy, open until 10pm, prescribed macrobid 100mg BID x 7d, push PO fluids. Notify office if no improvement in the next 48 hours. She will be notified if sensitivity shows resistance to macrobid.    Prev urine cx/UTI tx successfully with macrobid 5/2024 (cx grew staph at that time)    Follow up: forward this message to PMD office as BETSY

## 2025-07-16 NOTE — TELEPHONE ENCOUNTER
Patient called back, verified Name and . Following up on urinalysis result.    Dr. Angi Man please advise.